# Patient Record
Sex: FEMALE | Race: WHITE | NOT HISPANIC OR LATINO | Employment: PART TIME | URBAN - METROPOLITAN AREA
[De-identification: names, ages, dates, MRNs, and addresses within clinical notes are randomized per-mention and may not be internally consistent; named-entity substitution may affect disease eponyms.]

---

## 2023-11-14 ENCOUNTER — HOSPITAL ENCOUNTER (EMERGENCY)
Facility: HOSPITAL | Age: 58
Discharge: HOME | End: 2023-11-14
Attending: EMERGENCY MEDICINE | Admitting: EMERGENCY MEDICINE
Payer: COMMERCIAL

## 2023-11-14 VITALS
OXYGEN SATURATION: 98 % | TEMPERATURE: 98.2 F | RESPIRATION RATE: 18 BRPM | HEART RATE: 90 BPM | DIASTOLIC BLOOD PRESSURE: 81 MMHG | SYSTOLIC BLOOD PRESSURE: 176 MMHG

## 2023-11-14 DIAGNOSIS — K02.9 DENTAL DECAY: ICD-10-CM

## 2023-11-14 DIAGNOSIS — T14.8XXA MULTIPLE WOUNDS OF SKIN: Primary | ICD-10-CM

## 2023-11-14 PROCEDURE — 99283 EMERGENCY DEPT VISIT LOW MDM: CPT | Performed by: EMERGENCY MEDICINE

## 2023-11-14 PROCEDURE — 99282 EMERGENCY DEPT VISIT SF MDM: CPT

## 2023-11-15 NOTE — ED PROVIDER NOTES
History  Chief Complaint   Patient presents with    Medical Problem     Reports multiple issues. Reports ulcers on tongue, left upper lip split open. Sunday night reports ulcer on gums after putting new dentures in. Reports areas of Scabies since July. Been taking Ivermectin for scabies. HPI 62-year-old female with history of bipolar 1 disorder, PTSD, anxiety who presents emerged department for evaluation of pain in her gums as well as diffuse skin rash. Regarding the pain in her gums, patient says she felt there was a lesion on her bottom gums that "popped open, and was draining all day," causing a bad taste in her mouth. She states these are new dentures. States she has a long history of dental problems. Regarding the lesions on her skin, she says she is being treated by her PCP for [place] scabies, and is taking ivermectin. She states she has an ongoing care plan with her PCP for this problem. The patient has not had any chest pain or shortness of breath. She has not had any fevers. None       Past Medical History:   Diagnosis Date    Anxiety     Bipolar 1 disorder ([address])     Cervical cyst     PTSD (post-traumatic stress disorder)        Past Surgical History:   Procedure Laterality Date    CHOLECYSTECTOMY      HAND SURGERY Bilateral     SPINAL FUSION         History reviewed. No pertinent family history. I have reviewed and agree with the history as documented. E-Cigarette/Vaping    E-Cigarette Use Current Some Day User      E-Cigarette/Vaping Substances    Nicotine Yes     THC Yes     CBD No      Social History     Tobacco Use    Smoking status: Every Day     Packs/day: 0.50     Types: Cigarettes    Smokeless tobacco: Never   Vaping Use    Vaping Use: Some days    Substances: Nicotine, THC   Substance Use Topics    Alcohol use: Not Currently        Review of Systems   Constitutional:  Negative for chills and fever. HENT:  Positive for dental problem.  Negative for ear pain and sore throat. Eyes:  Negative for visual disturbance. Respiratory:  Negative for cough and shortness of breath. Cardiovascular:  Negative for chest pain and leg swelling. Gastrointestinal:  Negative for abdominal pain, blood in stool, constipation, diarrhea, nausea and vomiting. Genitourinary:  Negative for dysuria and hematuria. Musculoskeletal:  Negative for neck pain and neck stiffness. Skin:  Positive for rash. Neurological:  Negative for dizziness, syncope, weakness and light-headedness. All other systems reviewed and are negative. Physical Exam  ED Triage Vitals [11/14/23 2217]   Temperature Pulse Respirations Blood Pressure SpO2   98.2 °F (36.8 °C) 90 18 (!) 176/81 98 %      Temp Source Heart Rate Source Patient Position - Orthostatic VS BP Location FiO2 (%)   Oral Monitor -- -- --      Pain Score       --             Orthostatic Vital Signs  Vitals:    11/14/23 2217   BP: (!) 176/81   Pulse: 90       Physical Exam  Vitals and nursing note reviewed. Constitutional:       General: She is not in acute distress. Appearance: She is well-developed. She is not ill-appearing, toxic-appearing or diaphoretic. Comments: In her work uniform. Disheveled. HENT:      Head: Normocephalic and atraumatic. Mouth/Throat:      Mouth: Mucous membranes are moist. No oral lesions. Dentition: Abnormal dentition. Has dentures. Dental caries present. No dental abscesses. Tongue: No lesions. Palate: No mass. Pharynx: Uvula midline. Comments: Do not appreciate the ulcers on tongue or gums that patient is adamant are present  Eyes:      Conjunctiva/sclera: Conjunctivae normal.   Cardiovascular:      Rate and Rhythm: Normal rate and regular rhythm. Pulses: Normal pulses. Heart sounds: Normal heart sounds. No murmur heard. Pulmonary:      Effort: Pulmonary effort is normal. No respiratory distress. Breath sounds: Normal breath sounds.  No wheezing, rhonchi or rales.   Abdominal:      Palpations: Abdomen is soft. Tenderness: There is no abdominal tenderness. Musculoskeletal:         General: No swelling. Cervical back: Neck supple. Skin:     General: Skin is warm and dry. Capillary Refill: Capillary refill takes less than 2 seconds. Findings: Lesion, rash and wound present. No abscess. Rash is macular and purpuric. Rash is not nodular, pustular, scaling, urticarial or vesicular. Comments: Multiple scattered lesions other both unroofed and scabbed that cover the upper and lower extremities. spares the back. Neurological:      General: No focal deficit present. Mental Status: She is alert and oriented to person, place, and time. Psychiatric:         Mood and Affect: Mood normal.         ED Medications  Medications - No data to display    Diagnostic Studies  Results Reviewed       None                   No orders to display         Procedures  Procedures      ED Course                                       Medical Decision Making  68-year-old female with history of bipolar 1 disorder, PTSD, anxiety who presents emerged department for evaluation of pain in her gums as well as diffuse skin rash. Here in the emergency department, the patient is hemodynamically stable and afebrile, nontachycardic with normal work of breathing satting 98% on room air. She is awake, alert and oriented, not in acute distress. She is disheveled appearing. Her exam is notable for multiple missing teeth and dental decay, however do not appreciate any evidence of ulcers or abscess along the gumline. Her skin is scattered with multiple superficial abrasions and lesion as well as areas of excoriation, the lesions are located on her upper and lower extremities, but spare the back regarding her. Dental caries, she is provided with contact information for local dentist office.  Regarding her skin lesions, patient says she is actively working with her PCP on treatment for region scabies, and is taking ivermectin. Will defer further management to the PCP. Patient discharged to home in stable condition. Disposition  Final diagnoses:   Multiple wounds of skin   Dental decay     Time reflects when diagnosis was documented in both MDM as applicable and the Disposition within this note       Time User Action Codes Description Comment    11/14/2023 10:27 PM Carlo Pr[address] 8XXA] Multiple wounds of skin     11/14/2023 10:27 PM Joana Clayton Add [K02.9] Dental decay           ED Disposition       ED Disposition   Discharge    Condition   Stable    Date/Time   Tue Nov 14, 2023 10:28 PM    Comment   Juany Be discharge to home/self care. Follow-up Information       Follow up With Specialties Details Why Contact Info Additional [facility]   [address] [place] [id number]  [address], [address] [place] [address], [id number], [phone]            There are no discharge medications for this patient. No discharge procedures on file. PDMP Review       None             ED Provider  Attending physically available and evaluated Juany Haile. I managed the patient along with the ED Attending.     Electronically Signed by           Joana Clayton MD  11/20/23 0417

## 2023-11-19 NOTE — ED ATTENDING ATTESTATION
11/14/2023  IOli Sa, DO, saw and evaluated the patient. I have discussed the patient with the resident/non-physician practitioner and agree with the resident's/non-physician practitioner's findings, Plan of Care, and MDM as documented in the resident's/non-physician practitioner's note, except where noted. All available labs and Radiology studies were reviewed. I was present for key portions of any procedure(s) performed by the resident/non-physician practitioner and I was immediately available to provide assistance. At this point I agree with the current assessment done in the Emergency Department. I have conducted an independent evaluation of this patient a history and physical is as follows:      78-year-old female, presents requesting new medication for scabies. States she has been treated for scabies multiple times by her PCP with permethrin cream and now with ivermectin for concern for [place] scabies, states that she has very itchy skin all throughout, admits that she does scratch it does pick scabs off, improved with an Epsom salt bath also complaining of bleeding from her gums, states she has dentures, has only few native teeth remaining. Patient is here with her son      On exam patient has multiple PICC wounds over all exposed skin of her body, bilateral arms legs face neck chest abdomen only place that does not have any lesions in the middle of her back where she cannot reach. No evidence of scabies. Appears to be self-inflicted spit wounds. No evidence of gingival abscess. Long conversation with patient and treatment.,  I do believe that she does not have scabies I do think this is self-inflicted PICC wounds, no evidence of cellulitis or deeper infection at this time.   She does not have any systemic symptoms of be suggestive of endocarditis but I did explain to her she is at risk for this due to constant picking and potential source of seeding bacteremia.                       ED Course         Critical Care Time  Procedures